# Patient Record
Sex: FEMALE | URBAN - METROPOLITAN AREA
[De-identification: names, ages, dates, MRNs, and addresses within clinical notes are randomized per-mention and may not be internally consistent; named-entity substitution may affect disease eponyms.]

---

## 2018-12-26 ENCOUNTER — APPOINTMENT (RX ONLY)
Dept: URBAN - METROPOLITAN AREA CLINIC 45 | Facility: CLINIC | Age: 33
Setting detail: DERMATOLOGY
End: 2018-12-26

## 2018-12-26 DIAGNOSIS — L98.8 OTHER SPECIFIED DISORDERS OF THE SKIN AND SUBCUTANEOUS TISSUE: ICD-10-CM

## 2018-12-26 PROCEDURE — ? COUNSELING

## 2018-12-26 PROCEDURE — ? TREATMENT REGIMEN

## 2018-12-26 PROCEDURE — ? BOTOX

## 2018-12-26 NOTE — PROCEDURE: BOTOX
Additional Area 5 Units: 0
Post-Care Instructions: Patient instructed to not lie down for 4 hours and limit physical activity for 24 hours. Patient instructed not to travel by airplane for 48 hours.
Price (Use Numbers Only, No Special Characters Or $): 635
Consent: Written consent obtained. Risks include but not limited to lid/brow ptosis, bruising, swelling, diplopia, temporary effect, incomplete chemical denervation.
Dilution (U/0.1 Cc): 4
Detail Level: Detailed

## 2023-01-18 NOTE — PROCEDURE: TREATMENT REGIMEN
continue finger sticks with short acting insulin sliding scale  start low dose Lantus secondary to nausea, vomiting and poor po intake to avoid hypoglycemia   up-titrate as needed  HbA1C
Detail Level: Zone
Plan: Location: glabella, crow’s feet, forehead\\nTreatment: 32 units Botox — charge 30 units\\n\\nPatient with no previous treatments of Botox, but has had fillers before \\nPatient reports she would like a consultation and some treatments of Botox today\\nDiscussed with patient we will work on her glabella area and other concerning areas\\nToday will treat with 32 units of Botox \\n\\nF/u in 2 weeks